# Patient Record
Sex: FEMALE
[De-identification: names, ages, dates, MRNs, and addresses within clinical notes are randomized per-mention and may not be internally consistent; named-entity substitution may affect disease eponyms.]

---

## 2020-04-23 PROBLEM — Z00.00 ENCOUNTER FOR PREVENTIVE HEALTH EXAMINATION: Status: ACTIVE | Noted: 2020-04-23

## 2024-08-12 ENCOUNTER — APPOINTMENT (OUTPATIENT)
Dept: ORTHOPEDIC SURGERY | Facility: CLINIC | Age: 89
End: 2024-08-12
Payer: MEDICARE

## 2024-08-12 VITALS — WEIGHT: 120 LBS | BODY MASS INDEX: 23.56 KG/M2 | HEIGHT: 60 IN

## 2024-08-12 DIAGNOSIS — M51.36 OTHER INTERVERTEBRAL DISC DEGENERATION, LUMBAR REGION: ICD-10-CM

## 2024-08-12 DIAGNOSIS — I10 ESSENTIAL (PRIMARY) HYPERTENSION: ICD-10-CM

## 2024-08-12 DIAGNOSIS — I25.2 OLD MYOCARDIAL INFARCTION: ICD-10-CM

## 2024-08-12 DIAGNOSIS — I51.9 HEART DISEASE, UNSPECIFIED: ICD-10-CM

## 2024-08-12 DIAGNOSIS — M54.16 RADICULOPATHY, LUMBAR REGION: ICD-10-CM

## 2024-08-12 DIAGNOSIS — M41.56 OTHER SECONDARY SCOLIOSIS, LUMBAR REGION: ICD-10-CM

## 2024-08-12 DIAGNOSIS — E78.00 PURE HYPERCHOLESTEROLEMIA, UNSPECIFIED: ICD-10-CM

## 2024-08-12 DIAGNOSIS — Z86.59 PERSONAL HISTORY OF OTHER MENTAL AND BEHAVIORAL DISORDERS: ICD-10-CM

## 2024-08-12 DIAGNOSIS — M48.061 SPINAL STENOSIS, LUMBAR REGION WITHOUT NEUROGENIC CLAUDICATION: ICD-10-CM

## 2024-08-12 PROCEDURE — 72070 X-RAY EXAM THORAC SPINE 2VWS: CPT

## 2024-08-12 PROCEDURE — 72100 X-RAY EXAM L-S SPINE 2/3 VWS: CPT

## 2024-08-12 PROCEDURE — 99204 OFFICE O/P NEW MOD 45 MIN: CPT

## 2024-08-12 RX ORDER — NITROGLYCERIN 0.6 MG/1
TABLET SUBLINGUAL
Refills: 0 | Status: ACTIVE | COMMUNITY

## 2024-08-12 RX ORDER — AMLODIPINE BESYLATE 5 MG/1
TABLET ORAL
Refills: 0 | Status: ACTIVE | COMMUNITY

## 2024-08-12 RX ORDER — CARVEDILOL 3.12 MG/1
TABLET, FILM COATED ORAL
Refills: 0 | Status: ACTIVE | COMMUNITY

## 2024-08-12 RX ORDER — ATORVASTATIN CALCIUM 80 MG/1
TABLET, FILM COATED ORAL
Refills: 0 | Status: ACTIVE | COMMUNITY

## 2024-08-12 RX ORDER — FAMOTIDINE 20 MG/1
20 TABLET, FILM COATED ORAL
Refills: 0 | Status: ACTIVE | COMMUNITY

## 2024-08-12 RX ORDER — MELOXICAM 15 MG/1
15 TABLET ORAL DAILY
Qty: 30 | Refills: 1 | Status: ACTIVE | COMMUNITY
Start: 2024-08-12 | End: 1900-01-01

## 2024-08-12 RX ORDER — LAMOTRIGINE 25 MG/1
25 TABLET ORAL
Refills: 0 | Status: ACTIVE | COMMUNITY

## 2024-08-12 NOTE — IMAGING
[Scoliosis] : Scoliosis [Facet arthropathy] : Facet arthropathy [Disc space narrowing] : Disc space narrowing

## 2024-08-12 NOTE — PHYSICAL EXAM
[de-identified] : Constitutional: Well groomed and developed. Respiratory: Normal, unlabored breathing. No use of accessory muscles. Skin: No rashes or ulcers. Skin warm and dry. Psychiatric: Oriented to time, place, person and event. No acute distress. Gait: antalgic gait, using walker   Lumbar spine: Posture: coronal and sagittal malalignment AROM: Flexion 60 Extension 10 Moderate pain with simulated truncal motion   Tenderness: Thoracic: No tenderness on palpation Lumbar: Moderate tenderness on palpation Sacrum/coccyx: no tenderness on palpation Greater trochanteric bursa:  No tenderness PSIS: None     Motor:                          R             L LE:                               IS                    5             5 Quad              5             5 TA                  5             5 EHL                5             5 Gastroc         5             5                  R  L DTR: Patella  2+  2+ Achilles  2+  2+   Sensory: Light touch sensation intact T12-S1 Babinski's Sign: Negative bilaterally Straight leg raise test: Negative bilaterally FRED test: Negative bilaterally Facet loading: Negative bilaterally

## 2024-08-12 NOTE — ASSESSMENT
[FreeTextEntry1] : -Recommend LSO brace to provide stability and facilitate healing   - Patient given prescription for Meloxicam 15mg today. Advised patient to take once a day with food and to discontinue usage of other NSAIDs concurrently. Educated patient on potential adverse effects of long term NSAID use, including development of gastric ulcers and renal injury.   - Recommend physical therapy to regain range of motion, strengthening and symptomatic improvement. Prescription given in office today.   -Patient currently on blood thinners- not a candidate for injection at this time.   - Recommend NSAIDs PRN - Recommend heating pad use to decrease muscle spasm - Discussed the importance of home exercises, including but not limited to hamstring stretching and core strengthening   Patient was educated on their diagnosis today. All questions answered and patient expressed understanding.

## 2024-08-12 NOTE — HISTORY OF PRESENT ILLNESS
[Lower back] : lower back [Gradual] : gradual [10] : 10 [Burning] : burning [Sharp] : sharp [Tightness] : tightness [Constant] : constant [Nothing helps with pain getting better] : Nothing helps with pain getting better [Sitting] : sitting [Standing] : standing [Walking] : walking [Retired] : Work status: retired [de-identified] : Patient presents today with back pain since 12/2023 with NKI. Patient saw her PCP, was given hip injections, and was prescribed Oxycodone. Patient states she has pain throughout her entire back. Patient admits to taking Tylenol for pain with minimal relief. Patient denies recent imaging. Patient is using a walker for ambulation. [] : no

## 2024-10-10 ENCOUNTER — RX RENEWAL (OUTPATIENT)
Age: 89
End: 2024-10-10